# Patient Record
(demographics unavailable — no encounter records)

---

## 2025-03-27 NOTE — REVIEW OF SYSTEMS
[Fever] : no fever [Chills] : no chills [Night Sweats] : no night sweats [Earache] : no earache [Nasal Discharge] : no nasal discharge [Sore Throat] : no sore throat [Postnasal Drip] : no postnasal drip [Chest Pain] : no chest pain [Palpitations] : no palpitations [Lower Ext Edema] : no lower extremity edema [Shortness Of Breath] : no shortness of breath [Wheezing] : no wheezing [Cough] : no cough [Abdominal Pain] : no abdominal pain [Nausea] : no nausea [Constipation] : no constipation [Diarrhea] : diarrhea [Vomiting] : no vomiting [Heartburn] : no heartburn [Melena] : no melena [Dysuria] : no dysuria [Hematuria] : no hematuria [Frequency] : no frequency [Headache] : no headache [Dizziness] : no dizziness

## 2025-03-27 NOTE — PLAN
[FreeTextEntry1] : Case discussed with Dr. Ruiz  RTC in 4 weeks to re-assess metformin and weight loss

## 2025-03-27 NOTE — HISTORY OF PRESENT ILLNESS
[No Pertinent Cardiac History] : no history of aortic stenosis, atrial fibrillation, coronary artery disease, recent myocardial infarction, or implantable device/pacemaker [Asthma] : asthma [Moderate (4-6 METs)] : Moderate (4-6 METs) [(Patient denies any chest pain, claudication, dyspnea on exertion, orthopnea, palpitations or syncope)] : Patient denies any chest pain, claudication, dyspnea on exertion, orthopnea, palpitations or syncope [COPD] : no COPD [Sleep Apnea] : no sleep apnea [Smoker] : not a smoker [Family Member] : no family member with adverse anesthesia reaction/sudden death [Self] : no previous adverse anesthesia reaction [Chronic Anticoagulation] : no chronic anticoagulation [Chronic Kidney Disease] : no chronic kidney disease [Diabetes] : no diabetes [FreeTextEntry1] : 04/11/2025 [FreeTextEntry2] : 04/11/2025 [FreeTextEntry3] : Dr. Duane Almeiad [FreeTextEntry4] : 60-year-old female with asthma (never hospitalized for asthma, used albuterol PRN), hypothyroidism, varicose veins, L should adhesive capsulitis, prior L retinal tear s/p repair and preDM (A1c 6.1), now presenting for pre-op clearance for Liposuction of submental region and jaw line with Dr. Duane Almeida on 4/11/25 at 3PM. The following are required: CBC, BMP, PTT/PT/INR, EKG. Pt is also requesting GYN referral for papsmear and endocrine referral for hypothyroidism (prior endocrinologist no longer takes her insurance). Pt would also like refills on her medications.

## 2025-03-27 NOTE — PLAN
[FreeTextEntry1] : Case discussed with Dr. Riuz  RTC in 4 weeks to re-assess metformin and weight loss

## 2025-03-27 NOTE — HISTORY OF PRESENT ILLNESS
[No Pertinent Cardiac History] : no history of aortic stenosis, atrial fibrillation, coronary artery disease, recent myocardial infarction, or implantable device/pacemaker [Asthma] : asthma [Moderate (4-6 METs)] : Moderate (4-6 METs) [(Patient denies any chest pain, claudication, dyspnea on exertion, orthopnea, palpitations or syncope)] : Patient denies any chest pain, claudication, dyspnea on exertion, orthopnea, palpitations or syncope [COPD] : no COPD [Sleep Apnea] : no sleep apnea [Smoker] : not a smoker [Family Member] : no family member with adverse anesthesia reaction/sudden death [Self] : no previous adverse anesthesia reaction [Chronic Anticoagulation] : no chronic anticoagulation [Chronic Kidney Disease] : no chronic kidney disease [Diabetes] : no diabetes [FreeTextEntry1] : 04/11/2025 [FreeTextEntry2] : 04/11/2025 [FreeTextEntry3] : Dr. Duane Almeida [FreeTextEntry4] : 60-year-old female with asthma (never hospitalized for asthma, used albuterol PRN), hypothyroidism, varicose veins, L should adhesive capsulitis, prior L retinal tear s/p repair and preDM (A1c 6.1), now presenting for pre-op clearance for Liposuction of submental region and jaw line with Dr. Duane Almeida on 4/11/25 at 3PM. The following are required: CBC, BMP, PTT/PT/INR, EKG. Pt is also requesting GYN referral for papsmear and endocrine referral for hypothyroidism (prior endocrinologist no longer takes her insurance). Pt would also like refills on her medications.

## 2025-03-27 NOTE — ASSESSMENT
[Patient Optimized for Surgery] : Patient optimized for surgery [Continue medications as is] : Continue current medications [As per surgery] : as per surgery [FreeTextEntry4] : 60F w/asthma, hypothyroidism, predm here for preop Liposuction of submental region and jaw line.  Stable exam, labs reviewed.

## 2025-05-06 NOTE — REVIEW OF SYSTEMS
[Fatigue] : no fatigue [Vision Problems] : no vision problems [Chest Pain] : no chest pain [Palpitations] : no palpitations [Shortness Of Breath] : no shortness of breath [Abdominal Pain] : no abdominal pain [Constipation] : no constipation [Diarrhea] : diarrhea [Dysuria] : no dysuria [Joint Pain] : no joint pain [Nail Changes] : no nail changes [Headache] : no headache [Anxiety] : no anxiety [Depression] : no depression

## 2025-05-06 NOTE — HISTORY OF PRESENT ILLNESS
[FreeTextEntry1] : pt here for follow up [de-identified] : 60-year-old female with asthma (never hospitalized for asthma, used albuterol PRN), hypothyroidism, varicose veins, L should adhesive capsulitis, prior L retinal tear s/p repair and preDM (A1c 6.1) presents for follow up for weight loss and blood pressure. Patie states that her weight didn't change as much as she would like, patient states that her weight is lateral. Patient states that she took metformin for roughly 3 weeks and then stopped due to bloating, GI symptoms, so she wanted to stop taking the medication. Patient blood pressure continues to be less than 140, is not taking any medications. Patients surgery went well, liposuction of jaw, no post-opt complications.  Patients diet, states that she fasts a lot, her lunch break is early, does not eat breakfast, takes Synthroid in the am. Patient will go to school, eat vegetables, does not like meat, will try to get protein from chick peas. Patient states that she will see her endocrinologist, not scheduled with SUSAN Coulter. Patient states that she wants to jump start her weight loss on medications then get off them. Reyjaun, in regards to her asthma, only uses albuterol inhaler in the morning on her way to work, does not need additional doses.

## 2025-05-06 NOTE — PHYSICAL EXAM
[No Acute Distress] : no acute distress [No Respiratory Distress] : no respiratory distress  [Normal Rate] : normal rate  [Normal S1, S2] : normal S1 and S2 [No Murmur] : no murmur heard [Soft] : abdomen soft [Non Tender] : non-tender [Non-distended] : non-distended [No Joint Swelling] : no joint swelling [No Rash] : no rash

## 2025-05-06 NOTE — END OF VISIT
[] : Resident [FreeTextEntry3] :  60 year old F presenting for f/u of weight. Did not tolerate metformin due to GI side effects. Interested in GLP1 agonist. Discussed risks/benefits/alternatives and prescribed Wegovy. Discussed potential high cost as a barrier. Pt will consider [employer] program for Zepbound if not covered by insurance, also briefly discussed other non-GLP1a medications for weight management if neither of those options are affordable.

## 2025-05-06 NOTE — HEALTH RISK ASSESSMENT
[Little interest or pleasure doing things] : 1) Little interest or pleasure doing things [Feeling down, depressed, or hopeless] : 2) Feeling down, depressed, or hopeless [0] : 2) Feeling down, depressed, or hopeless: Not at all (0) [PHQ-9 Negative - No further assessment needed] : PHQ-9 Negative - No further assessment needed [Never] : Never [[id number]] : 0

## 2025-05-06 NOTE — ASSESSMENT
[FreeTextEntry1] : 60-year-old female with asthma (never hospitalized for asthma, used albuterol PRN), hypothyroidism, varicose veins, L should adhesive capsulitis, prior L retinal tear s/p repair and preDM (A1c 6.1) presents for follow up for weight loss.